# Patient Record
Sex: FEMALE | Employment: FULL TIME | URBAN - METROPOLITAN AREA
[De-identification: names, ages, dates, MRNs, and addresses within clinical notes are randomized per-mention and may not be internally consistent; named-entity substitution may affect disease eponyms.]

---

## 2021-03-23 ENCOUNTER — OFFICE VISIT (OUTPATIENT)
Dept: URGENT CARE | Facility: CLINIC | Age: 36
End: 2021-03-23
Payer: COMMERCIAL

## 2021-03-23 ENCOUNTER — APPOINTMENT (OUTPATIENT)
Dept: RADIOLOGY | Facility: CLINIC | Age: 36
End: 2021-03-23
Payer: COMMERCIAL

## 2021-03-23 VITALS
HEART RATE: 82 BPM | HEIGHT: 59 IN | DIASTOLIC BLOOD PRESSURE: 62 MMHG | WEIGHT: 161.2 LBS | RESPIRATION RATE: 16 BRPM | BODY MASS INDEX: 32.5 KG/M2 | TEMPERATURE: 97.2 F | SYSTOLIC BLOOD PRESSURE: 104 MMHG | OXYGEN SATURATION: 98 %

## 2021-03-23 DIAGNOSIS — S30.0XXA CONTUSION OF COCCYX, INITIAL ENCOUNTER: Primary | ICD-10-CM

## 2021-03-23 DIAGNOSIS — M53.3 TAIL BONE PAIN: ICD-10-CM

## 2021-03-23 PROCEDURE — 72220 X-RAY EXAM SACRUM TAILBONE: CPT

## 2021-03-23 PROCEDURE — 99203 OFFICE O/P NEW LOW 30 MIN: CPT | Performed by: PHYSICIAN ASSISTANT

## 2021-03-23 RX ORDER — MULTIVIT-MIN/FERROUS FUMARATE 9 MG/15 ML
LIQUID (ML) ORAL
COMMUNITY

## 2021-03-23 RX ORDER — ACETAMINOPHEN 325 MG/1
650 TABLET ORAL EVERY 6 HOURS PRN
COMMUNITY

## 2021-03-23 RX ORDER — ATORVASTATIN CALCIUM 20 MG/1
20 TABLET, FILM COATED ORAL DAILY
COMMUNITY
Start: 2021-02-22

## 2021-03-24 NOTE — PATIENT INSTRUCTIONS
Coccyx Injury   AMBULATORY CARE:   A coccyx (tailbone) injury  A coccyx (tailbone) injury may include a fracture or dislocation  Common signs and symptoms:  Coccyx pain may last for a short time or continue longer than 2 months  You may have any of the following:  · Pain when you sit or stand    · Pain in your buttocks that spreads to your thighs or legs    · Pain during bowel movements, when having sex, and when bending or lifting objects    · Bruises or swelling on your coccyx or lower back    · Low backache or pressure in your pelvis    · Trouble standing up or walking    Call your local emergency number (911 in the [address] [facility]) if:   · You cannot move your legs  · Your legs suddenly go numb  Seek care immediately if:   · You have severe pain  Call your doctor if:   · You have trouble urinating or having a bowel movement  · Your pain or swelling gets worse or do not go away with treatment  · You have a fever  · You have questions or concerns about your condition or care  Treatment  may include any of the following:  · Reduction  may be needed if you have a dislocated coccyx  Your healthcare provider will move your tailbone into the correct position by hand  · Medicines  may be needed to relieve pain or to make it easier and less painful to have a bowel movement  Manage your symptoms:   · Use a donut-shaped cushion  to decrease pain and support your coccyx when you sit  · Apply ice  to help decrease swelling and pain  Use an ice pack, or put crushed ice in a plastic bag  Cover it with a towel and place it on your coccyx for 15 to 20 minutes every hour or as directed  · Sleep  on a firm mattress  Place a pillow under your knees if you sleep on your back  Or, sleep on your side with a pillow between your knees  This will decrease pain and tension in your coccyx and back      Follow up with your doctor as directed:  Write down your questions so you remember to ask them during your visits  © Copyright [address] [place] [address] Information is for End User's use only and may not be sold, redistributed or otherwise used for commercial purposes  All illustrations and images included in CareNotes® are the copyrighted property of [employer]  or [employer]  The above information is an  only  It is not intended as medical advice for individual conditions or treatments  Talk to your doctor, nurse or pharmacist before following any medical regimen to see if it is safe and effective for you

## 2021-03-29 NOTE — PROGRESS NOTES
[facility]        NAME: Trevor Jesus is a 38 y o  female  : 1985    MRN: [id number]  DATE: 2021  TIME: 4:31 AM    Assessment and Plan   Contusion of coccyx, initial encounter [S30  0XXA]  1  Contusion of coccyx, initial encounter     2  Tail bone pain  XR sacrum and coccyx    advised patient to purchase a donut from warmer to help with pressure on her tailbone when sitting  Patient Instructions       Follow up with PCP in 3-5 days  Proceed to  ER if symptoms worsen  Chief Complaint     Chief Complaint   Patient presents with    Tailbone Pain     Onset on the 3/15/2021 fell down steps  Presently still having tail bone pain from fall  Patient reports pain when sitting  Using icy patches, taking tylenol and icing  History of Present Illness         75-year-old female presents the clinic with tailbone pain that started on 03/15/2021 when she fell down approximately 14 steps  Patient states that the steps were carpeted and notes that she still has tailbone pain from her fall  Patient notes that pain worsens when she is sitting for long periods of time  Patient has been using icy hot patches, taking Tylenol and using ice to the area without improvement in symptoms  Review of Systems   Review of Systems   Constitutional: Negative for chills and fever  Respiratory: Negative for chest tightness, shortness of breath and wheezing  Cardiovascular: Negative for chest pain and palpitations  Gastrointestinal: Negative for abdominal pain, nausea and vomiting  Genitourinary: Negative for dysuria, flank pain, frequency, hematuria and urgency  Musculoskeletal: Positive for back pain (tailbone pain)  Negative for myalgias, neck pain and neck stiffness  Skin: Negative for rash  Neurological: Negative for dizziness, weakness, light-headedness, numbness and headaches  All other systems reviewed and are negative          Current Medications       Current Outpatient Medications:     acetaminophen (TYLENOL) 325 mg tablet, Take 650 mg by mouth every 6 (six) hours as needed for mild pain, Disp: , Rfl:     atorvastatin (LIPITOR) 20 mg tablet, Take 20 mg by mouth daily, Disp: , Rfl:     Cholecalciferol (Vitamin D3) 30 MCG/15ML LIQD, Take by mouth, Disp: , Rfl:     Current Allergies     Allergies as of 03/23/2021    (No Known Allergies)            The following portions of the patient's history were reviewed and updated as appropriate: allergies, current medications, past family history, past medical history, past social history, past surgical history and problem list      History reviewed  No pertinent past medical history  History reviewed  No pertinent surgical history  History reviewed  No pertinent family history  Medications have been verified  Objective   /62   Pulse 82   Temp (!) 97 2 °F (36 2 °C) (Tympanic)   Resp 16   Ht 4' 11" (1 499 m)   Wt 73 1 kg (161 lb 3 2 oz)   SpO2 98%   BMI 32 56 kg/m²   No LMP recorded  Physical Exam     Physical Exam  Vitals signs and nursing note reviewed  Constitutional:       General: She is not in acute distress  Appearance: She is well-developed  She is not diaphoretic  HENT:      Head: Normocephalic and atraumatic  Pulmonary:      Effort: Pulmonary effort is normal    Musculoskeletal: Normal range of motion  Lumbar back: She exhibits tenderness  She exhibits normal range of motion, no bony tenderness, no swelling, no edema, no deformity and no laceration  Back:       Comments:   TTP to sacral area and pain with sitting on exam table  Full AROM without difficulty of torso  Coccyx x-ray: no acute osseous abnormality noted   Skin:     General: Skin is warm and dry  Capillary Refill: Capillary refill takes less than 2 seconds  Neurological:      Mental Status: She is alert and oriented to person, place, and time

## 2022-03-11 ENCOUNTER — HOSPITAL ENCOUNTER (OUTPATIENT)
Facility: CLINIC | Age: 37
Discharge: HOME | End: 2022-03-11
Attending: FAMILY MEDICINE
Payer: COMMERCIAL

## 2022-03-11 VITALS
HEART RATE: 78 BPM | OXYGEN SATURATION: 99 % | DIASTOLIC BLOOD PRESSURE: 60 MMHG | SYSTOLIC BLOOD PRESSURE: 104 MMHG | TEMPERATURE: 98 F

## 2022-03-11 DIAGNOSIS — R42 VERTIGO: Primary | ICD-10-CM

## 2022-03-11 PROCEDURE — S9083 URGENT CARE CENTER GLOBAL: HCPCS | Performed by: FAMILY MEDICINE

## 2022-03-11 PROCEDURE — 99202 OFFICE O/P NEW SF 15 MIN: CPT | Performed by: FAMILY MEDICINE

## 2022-03-11 RX ORDER — MECLIZINE HYDROCHLORIDE 25 MG/1
25 TABLET ORAL 3 TIMES DAILY PRN
Qty: 30 TABLET | Refills: 0 | Status: SHIPPED | OUTPATIENT
Start: 2022-03-11 | End: 2022-04-10

## 2022-03-11 RX ORDER — ATORVASTATIN CALCIUM 20 MG/1
20 TABLET, FILM COATED ORAL
COMMUNITY
Start: 2022-02-18

## 2022-03-11 NOTE — DISCHARGE INSTRUCTIONS
Take meclizine as needed and flonase- 2 sprays each nostril once daily  If not improving follow up with ENT as discussed  [facility]  [address] [address]  Telephone: [phone]

## 2022-03-11 NOTE — ED PROVIDER NOTES
History  Chief Complaint   Patient presents with   • Dizziness     Pt here complaint of dizziness for the last week.  Went to a different  2 days ago and a urine was checked that was normal and discharge without meds.  No previous history of dizziness.  Denies any head trauma.  No change in vision.  No other complaints.          No past medical history on file.    No past surgical history on file.    No family history on file.    Social History     Tobacco Use   • Smoking status: Not on file   • Smokeless tobacco: Not on file   Substance Use Topics   • Alcohol use: Not on file   • Drug use: Not on file       Review of Systems    Physical Exam  ED Triage Vitals [03/11/22 1517]   Temp Heart Rate Resp BP SpO2   36.7 °C (98 °F) 78 -- 104/60 99 %      Temp src Heart Rate Source Patient Position BP Location FiO2 (%) (Set)   -- -- -- -- --       Physical Exam  Constitutional:       Appearance: Normal appearance.   HENT:      Ears:      Comments: Bilateral serous effiusions  Cardiovascular:      Rate and Rhythm: Normal rate and regular rhythm.      Pulses: Normal pulses.      Heart sounds: Normal heart sounds.   Pulmonary:      Effort: Pulmonary effort is normal.      Breath sounds: Normal breath sounds.   Abdominal:      General: Abdomen is flat.      Palpations: Abdomen is soft.   Neurological:      Mental Status: She is alert.           Procedures  Procedures     Course  Clinical Impressions as of 03/11/22 1533   Vertigo       MDM  Number of Diagnoses or Management Options  Vertigo  Diagnosis management comments: Looks to be fluid mediated in inner ear  Trial meclizine and flonase  Follow up with ENT to consider Epley maneuver if not improved into next week                 Sundeep Rivers,   03/11/22 1534